# Patient Record
Sex: FEMALE | Race: WHITE | NOT HISPANIC OR LATINO | URBAN - NONMETROPOLITAN AREA
[De-identification: names, ages, dates, MRNs, and addresses within clinical notes are randomized per-mention and may not be internally consistent; named-entity substitution may affect disease eponyms.]

---

## 2022-08-16 ENCOUNTER — OFFICE (OUTPATIENT)
Dept: URBAN - NONMETROPOLITAN AREA CLINIC 5 | Facility: CLINIC | Age: 67
End: 2022-08-16

## 2022-08-16 VITALS
RESPIRATION RATE: 18 BRPM | DIASTOLIC BLOOD PRESSURE: 73 MMHG | HEIGHT: 70 IN | WEIGHT: 205 LBS | HEART RATE: 71 BPM | SYSTOLIC BLOOD PRESSURE: 141 MMHG

## 2022-08-16 DIAGNOSIS — Z86.010 PERSONAL HISTORY OF COLONIC POLYPS: ICD-10-CM

## 2022-08-16 DIAGNOSIS — K92.1 MELENA: ICD-10-CM

## 2022-08-16 DIAGNOSIS — R19.5 OTHER FECAL ABNORMALITIES: ICD-10-CM

## 2022-08-16 DIAGNOSIS — R19.7 DIARRHEA, UNSPECIFIED: ICD-10-CM

## 2022-08-16 DIAGNOSIS — R56.9 UNSPECIFIED CONVULSIONS: ICD-10-CM

## 2022-08-16 PROCEDURE — 99214 OFFICE O/P EST MOD 30 MIN: CPT | Performed by: NURSE PRACTITIONER

## 2022-10-03 ENCOUNTER — ON CAMPUS - OUTPATIENT (OUTPATIENT)
Dept: URBAN - NONMETROPOLITAN AREA HOSPITAL 28 | Facility: HOSPITAL | Age: 67
End: 2022-10-03

## 2022-10-03 DIAGNOSIS — K64.1 SECOND DEGREE HEMORRHOIDS: ICD-10-CM

## 2022-10-03 DIAGNOSIS — K92.1 MELENA: ICD-10-CM

## 2022-10-03 DIAGNOSIS — K63.3 ULCER OF INTESTINE: ICD-10-CM

## 2022-10-03 DIAGNOSIS — Z86.010 PERSONAL HISTORY OF COLONIC POLYPS: ICD-10-CM

## 2022-10-03 PROCEDURE — 45380 COLONOSCOPY AND BIOPSY: CPT | Performed by: INTERNAL MEDICINE

## 2022-11-03 ENCOUNTER — OFFICE (OUTPATIENT)
Dept: URBAN - NONMETROPOLITAN AREA CLINIC 5 | Facility: CLINIC | Age: 67
End: 2022-11-03

## 2022-11-03 DIAGNOSIS — K92.1 MELENA: ICD-10-CM

## 2023-12-20 ENCOUNTER — OFFICE (OUTPATIENT)
Dept: URBAN - NONMETROPOLITAN AREA CLINIC 5 | Facility: CLINIC | Age: 68
End: 2023-12-20

## 2023-12-20 VITALS
HEART RATE: 73 BPM | WEIGHT: 216 LBS | SYSTOLIC BLOOD PRESSURE: 145 MMHG | DIASTOLIC BLOOD PRESSURE: 86 MMHG | RESPIRATION RATE: 18 BRPM | HEIGHT: 70 IN

## 2023-12-20 DIAGNOSIS — K62.5 HEMORRHAGE OF ANUS AND RECTUM: ICD-10-CM

## 2023-12-20 DIAGNOSIS — K64.9 UNSPECIFIED HEMORRHOIDS: ICD-10-CM

## 2023-12-20 DIAGNOSIS — K59.09 OTHER CONSTIPATION: ICD-10-CM

## 2023-12-20 PROCEDURE — 99213 OFFICE O/P EST LOW 20 MIN: CPT | Performed by: NURSE PRACTITIONER

## 2024-07-17 ENCOUNTER — OFFICE (OUTPATIENT)
Dept: URBAN - NONMETROPOLITAN AREA CLINIC 5 | Facility: CLINIC | Age: 69
End: 2024-07-17

## 2024-07-17 VITALS
WEIGHT: 205 LBS | RESPIRATION RATE: 18 BRPM | HEIGHT: 70 IN | SYSTOLIC BLOOD PRESSURE: 151 MMHG | DIASTOLIC BLOOD PRESSURE: 86 MMHG | HEART RATE: 77 BPM

## 2024-07-17 DIAGNOSIS — K59.00 CONSTIPATION, UNSPECIFIED: ICD-10-CM

## 2024-07-17 DIAGNOSIS — M54.9 DORSALGIA, UNSPECIFIED: ICD-10-CM

## 2024-07-17 DIAGNOSIS — Z91.199 PATIENT'S NONCOMPLIANCE WITH OTHER MEDICAL TREATMENT AND REG: ICD-10-CM

## 2024-07-17 DIAGNOSIS — K62.5 HEMORRHAGE OF ANUS AND RECTUM: ICD-10-CM

## 2024-07-17 PROCEDURE — 99214 OFFICE O/P EST MOD 30 MIN: CPT | Performed by: NURSE PRACTITIONER

## 2024-07-17 NOTE — SERVICEHPINOTES
Enedina Lockwood   is a   68   year old  female   who is here today for routine follow up.  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 I saw her in August 2022 and she had complaints of "ribbon shaped stools" and at times "white stool" and hematochezia. Colonoscopy on 10/03/2022 revealing inflammation in the cecum consistent with NSAID use and grade 2 hemorrhoids with stigmata of bleeding. 
lotus gautam I last saw her on 12/20/2023 with complaints of constipation and occasional rectal bleeding. She was refusing to take any medications due to fear of side effects for HTN, symptomatic hemorrhoids and constipation stating,  "no offense to you, but all of you health care providers are pill pushers and no one wants to do anything naturally." CT abd/pelvis on 12/05/2023 for abdominal pain that was unremarkable.
lotus gautam She comes in today after she has been seen at [place] [facility] on 06/24 for constipation and back pain. This was 4 days after she was seen at [facility] in [place] and was insturcted to take 1 cap full of Miralax for the first 3 days and increase the dose to two cap fulls on the 4th day. She reports she had no bowel movement by the 4th day and had severe abdominal pain and went to the ER. She asked for a soap suds enema which relieved her discomfort. She refused medications from the ER. 
lotus gautam I do not have any records from [facility]. She patient is a poor historian and complaints of her constipation "attacking my spine". However, she will not take daily laxatives to move her bowels. She continues to report occasional "white stools" and is concerend she has an obsturction which was ruled out by CT scan in [place]. She is not seeing Neurology for her spinal issues. She reports the stools "hits a certain spot in my colon which attacks my spine". Reports occasional rectal bleeding when wiping after straining during a bowel movement. It has been over a week since she has had a bowel movement.

## 2024-07-17 NOTE — SERVICENOTES
I spent 35 minutes with the patient discussing her symptoms and concerns for obstruction versus colon cancer.  We went over her previous visits with me back in 2022 and 2023 when she had similar symptoms and we scheduled a colonoscopy where she did not have any colon polyps, colon cancer or obstruction.  We discussed her results in the ER and that her CT scan did not reveal obstruction.  We discussed her "spinal attacks" when she is constipated and how important it is for her to take daily laxatives to help keep her bowels regular to prevent this discomfort.  We discussed compliance with medications as she has a fear of taking medications due side effects.  She verbalized understanding and is willing to try these samples I am providing.  Our goal is to prevent constipation so we prevent pain.